# Patient Record
Sex: MALE | Race: WHITE | NOT HISPANIC OR LATINO | Employment: OTHER | URBAN - METROPOLITAN AREA
[De-identification: names, ages, dates, MRNs, and addresses within clinical notes are randomized per-mention and may not be internally consistent; named-entity substitution may affect disease eponyms.]

---

## 2022-09-01 ENCOUNTER — OFFICE VISIT (OUTPATIENT)
Dept: GASTROENTEROLOGY | Facility: CLINIC | Age: 32
End: 2022-09-01

## 2022-09-01 VITALS
DIASTOLIC BLOOD PRESSURE: 82 MMHG | HEIGHT: 72 IN | BODY MASS INDEX: 28.33 KG/M2 | WEIGHT: 209.2 LBS | SYSTOLIC BLOOD PRESSURE: 140 MMHG

## 2022-09-01 DIAGNOSIS — K21.9 GASTROESOPHAGEAL REFLUX DISEASE WITHOUT ESOPHAGITIS: ICD-10-CM

## 2022-09-01 DIAGNOSIS — R10.11 RUQ ABDOMINAL PAIN: Primary | ICD-10-CM

## 2023-05-05 ENCOUNTER — TELEPHONE (OUTPATIENT)
Dept: GASTROENTEROLOGY | Facility: CLINIC | Age: 33
End: 2023-05-05

## 2023-05-05 NOTE — TELEPHONE ENCOUNTER
Patients GI provider:  Dr Lu Campbell    Number to return call: [phone]    Reason for call: Pt calling stating had gall bladder attack, urine is dark and feels tired       Scheduled procedure/appointment date if applicable: Apt 8/7/83

## 2023-05-05 NOTE — TELEPHONE ENCOUNTER
I spoke with patient's wife and reviewed recommendations  I provided [facility] scheduling phone number  They will complete us and labs previously ordered

## 2023-05-05 NOTE — TELEPHONE ENCOUNTER
"I spoke with patient, he has had 3 \"attacks\" past week, first time since September visit  Usually comes on approximately 9:15 pm then dissipates  One episode did wake him up from sleep  This is the first time he noticed darker urine  He states he is not showing any signs of jaundice  I advised patient complete the testing you ordered in September to evaluate for stones  Did you want to order any other testing  I am to contact his wife to update and provide [facility] scheduling phone number  Please advise    "

## 2023-05-06 LAB
ALBUMIN SERPL-MCNC: 4.7 G/DL (ref 3.6–5.1)
ALBUMIN/GLOB SERPL: 1.7 (CALC) (ref 1–2.5)
ALP SERPL-CCNC: 129 U/L (ref 36–130)
ALT SERPL-CCNC: 1016 U/L (ref 9–46)
AST SERPL-CCNC: 417 U/L (ref 10–40)
BASOPHILS # BLD AUTO: 18 CELLS/UL (ref 0–200)
BASOPHILS NFR BLD AUTO: 0.3 %
BILIRUB SERPL-MCNC: 1.8 MG/DL (ref 0.2–1.2)
BUN SERPL-MCNC: 13 MG/DL (ref 7–25)
BUN/CREAT SERPL: ABNORMAL (CALC) (ref 6–22)
CALCIUM SERPL-MCNC: 9.5 MG/DL (ref 8.6–10.3)
CHLORIDE SERPL-SCNC: 104 MMOL/L (ref 98–110)
CO2 SERPL-SCNC: 28 MMOL/L (ref 20–32)
CREAT SERPL-MCNC: 0.91 MG/DL (ref 0.6–1.26)
EOSINOPHIL # BLD AUTO: 159 CELLS/UL (ref 15–500)
EOSINOPHIL NFR BLD AUTO: 2.6 %
ERYTHROCYTE [DISTWIDTH] IN BLOOD BY AUTOMATED COUNT: 12.5 % (ref 11–15)
GFR/BSA.PRED SERPLBLD CYS-BASED-ARV: 116 ML/MIN/1.73M2
GLOBULIN SER CALC-MCNC: 2.8 G/DL (CALC) (ref 1.9–3.7)
GLUCOSE SERPL-MCNC: 102 MG/DL (ref 65–99)
HCT VFR BLD AUTO: 42 % (ref 38.5–50)
HGB BLD-MCNC: 14.2 G/DL (ref 13.2–17.1)
LYMPHOCYTES # BLD AUTO: 1025 CELLS/UL (ref 850–3900)
LYMPHOCYTES NFR BLD AUTO: 16.8 %
MCH RBC QN AUTO: 29.2 PG (ref 27–33)
MCHC RBC AUTO-ENTMCNC: 33.8 G/DL (ref 32–36)
MCV RBC AUTO: 86.2 FL (ref 80–100)
MONOCYTES # BLD AUTO: 232 CELLS/UL (ref 200–950)
MONOCYTES NFR BLD AUTO: 3.8 %
NEUTROPHILS # BLD AUTO: 4667 CELLS/UL (ref 1500–7800)
NEUTROPHILS NFR BLD AUTO: 76.5 %
PLATELET # BLD AUTO: 210 THOUSAND/UL (ref 140–400)
PMV BLD REES-ECKER: 12.1 FL (ref 7.5–12.5)
POTASSIUM SERPL-SCNC: 3.5 MMOL/L (ref 3.5–5.3)
PROT SERPL-MCNC: 7.5 G/DL (ref 6.1–8.1)
RBC # BLD AUTO: 4.87 MILLION/UL (ref 4.2–5.8)
SODIUM SERPL-SCNC: 139 MMOL/L (ref 135–146)
WBC # BLD AUTO: 6.1 THOUSAND/UL (ref 3.8–10.8)

## 2023-05-08 ENCOUNTER — TELEPHONE (OUTPATIENT)
Dept: GASTROENTEROLOGY | Facility: CLINIC | Age: 33
End: 2023-05-08

## 2023-05-08 DIAGNOSIS — K80.50 BILIARY COLIC: Primary | ICD-10-CM

## 2023-05-08 NOTE — TELEPHONE ENCOUNTER
Could you please call pt's wife back with lab results as they are abnormal    5/5 telephone encounter has more information  He is scheduled for his US on 5/12

## 2023-05-08 NOTE — TELEPHONE ENCOUNTER
Patients GI provider:  Dr May Vail    Number to return call: [phone]    Reason for call: Pt wife calling for blood work results       Scheduled procedure/appointment date if applicable:

## 2023-05-08 NOTE — TELEPHONE ENCOUNTER
Pt's wife calling again in regards to pt's results  Please reach out to pt's wife, Fany Villarreal, in regards to this matter  She can be reached at [phone]

## 2023-05-10 DIAGNOSIS — K80.50 BILIARY COLIC: Primary | ICD-10-CM

## 2023-05-10 DIAGNOSIS — R79.89 ELEVATED LFTS: ICD-10-CM

## 2023-05-10 LAB
ALBUMIN SERPL-MCNC: 4.7 G/DL (ref 3.6–5.1)
ALBUMIN/GLOB SERPL: 1.7 (CALC) (ref 1–2.5)
ALP SERPL-CCNC: 119 U/L (ref 36–130)
ALT SERPL-CCNC: 488 U/L (ref 9–46)
AST SERPL-CCNC: 104 U/L (ref 10–40)
BILIRUB SERPL-MCNC: 0.9 MG/DL (ref 0.2–1.2)
BUN SERPL-MCNC: 16 MG/DL (ref 7–25)
BUN/CREAT SERPL: ABNORMAL (CALC) (ref 6–22)
CALCIUM SERPL-MCNC: 10.4 MG/DL (ref 8.6–10.3)
CHLORIDE SERPL-SCNC: 103 MMOL/L (ref 98–110)
CO2 SERPL-SCNC: 29 MMOL/L (ref 20–32)
CREAT SERPL-MCNC: 0.96 MG/DL (ref 0.6–1.26)
GFR/BSA.PRED SERPLBLD CYS-BASED-ARV: 108 ML/MIN/1.73M2
GLOBULIN SER CALC-MCNC: 2.8 G/DL (CALC) (ref 1.9–3.7)
GLUCOSE SERPL-MCNC: 82 MG/DL (ref 65–99)
POTASSIUM SERPL-SCNC: 4.4 MMOL/L (ref 3.5–5.3)
PROT SERPL-MCNC: 7.5 G/DL (ref 6.1–8.1)
SODIUM SERPL-SCNC: 141 MMOL/L (ref 135–146)

## 2023-05-12 ENCOUNTER — HOSPITAL ENCOUNTER (OUTPATIENT)
Dept: ULTRASOUND IMAGING | Facility: HOSPITAL | Age: 33
Discharge: HOME | End: 2023-05-12
Attending: INTERNAL MEDICINE

## 2023-05-12 DIAGNOSIS — R10.11 RUQ ABDOMINAL PAIN: ICD-10-CM

## 2023-05-16 ENCOUNTER — TELEPHONE (OUTPATIENT)
Dept: GASTROENTEROLOGY | Facility: CLINIC | Age: 33
End: 2023-05-16

## 2023-05-16 NOTE — TELEPHONE ENCOUNTER
Returned Ramon's wife, Per Score, phone call regarding result's of [address]  Not read yet, please call when resulted

## 2023-05-18 DIAGNOSIS — K80.50 BILIARY COLIC: Primary | ICD-10-CM

## 2023-06-19 ENCOUNTER — TELEPHONE (OUTPATIENT)
Age: 33
End: 2023-06-19

## 2023-06-19 NOTE — TELEPHONE ENCOUNTER
Patients GI provider:  Dr Brennan Estrada    Number to return call: ( [phone])    Reason for call: Pt calling reports he had surgery wile on vacation to have gallbladder removed  Pt has appt 7/7/2023  Wife is asking if this is to long to wait or do you want him squeezed in anywhere?      Scheduled procedure/appointment date if applicable: Apt/procedure

## 2023-06-26 ENCOUNTER — TELEPHONE (OUTPATIENT)
Age: 33
End: 2023-06-26

## 2023-06-26 NOTE — TELEPHONE ENCOUNTER
Patients GI provider:  Dr Carrion [facility]    Number to return call: [phone]    Reason for call: Pt's wife Cinthya Landry calling because she states the pt had emergency gallbladder surgery while they were on vacation and she would like to know if he should have a sooner appointment      Scheduled procedure/appointment date if applicable: Appt 0/6/13

## 2023-07-07 ENCOUNTER — OFFICE VISIT (OUTPATIENT)
Age: 33
End: 2023-07-07

## 2023-07-07 VITALS
WEIGHT: 270.8 LBS | DIASTOLIC BLOOD PRESSURE: 70 MMHG | SYSTOLIC BLOOD PRESSURE: 118 MMHG | BODY MASS INDEX: 36.68 KG/M2 | HEIGHT: 72 IN

## 2023-07-07 DIAGNOSIS — R79.89 ELEVATED LFTS: Primary | ICD-10-CM

## 2023-07-07 DIAGNOSIS — K76.0 FATTY LIVER: ICD-10-CM

## 2023-07-07 PROCEDURE — 99213 OFFICE O/P EST LOW 20 MIN: CPT | Performed by: INTERNAL MEDICINE

## 2023-07-07 NOTE — PROGRESS NOTES
[facility] - Outpatient Follow-up Note  Amna Solders 33 y.o. male MRN: [id number]  Encounter: [id number]    ASSESSMENT AND PLAN:      1. Elevated LFTs  Patient's last LFTs were still elevated although trending down from his hospitalization with gallstone pancreatitis. We will recheck LFTs to resolution.  - Hepatic function panel; Future  - Hepatic function panel    2. Fatty liver  Patient was noted to have fatty liver disease on US. Obtain LFTs to see if theres any component of Levy. Encouraged diet and exercise     Follow up appointment: Recheck LFTs  ______________________________________________________________________    Chief Complaint   Patient presents with   • follow up cholecystectomy     HPI:   62-year-old male who is presenting for follow-up regarding abdominal pain. Was last seen in the office on 9/22 with right upper quadrant abdominal pain and GERD. LFTs in June 2023 notable to be elevated AST of 59 ALT of 232 alk phos of 144 T. bili of 0.9. Right upper quadrant ultrasound with dilated intrahepatic biliary duct with gallstones possibility of choledocholithiasis. Gallstones. Diffuse fatty liver. MRCP showed acute pancreatitis with no evidence of choledocholithiasis. Status post cholecystectomy. Now feeling well after his procedure. No longer having any right upper quadrant abdominal pain. No nausea or vomiting. No change of bowel habits with diarrhea. Heartburn is largely resolved as well    Historical Information   History reviewed. No pertinent past medical history.   Past Surgical History:   Procedure Laterality Date   • CHOLECYSTECTOMY       Social History     Substance and Sexual Activity   Alcohol Use Yes    Comment: occasionally     Social History     Substance and Sexual Activity   Drug Use Never     Social History     Tobacco Use   Smoking Status Former   Smokeless Tobacco Never     Family History   Problem Relation Age of Onset   • No Known Problems Mother    • No Known Problems Father    • No Known Problems Brother    • No Known Problems Brother    • No Known Problems Brother    • Colon cancer Neg Hx    • Colon polyps Neg Hx        No current outpatient medications on file. Allergies   Allergen Reactions   • Amoxicillin Rash   • Cefazolin Rash   • Erythromycin Rash     Reviewed medications and allergies and updated as indicated    PHYSICAL EXAM:    Blood pressure 118/70, height 6' (1.829 m), weight 94.3 kg (207 lb 12.8 oz). Body mass index is 28.18 kg/m². General Appearance: NAD, cooperative, alert  Eyes: Anicteric  GI:  Soft, non-tender, non-distended; normal bowel sounds; no masses, no organomegaly . Surgical incision clean dry intact  Rectal: Deferred  Musculoskeletal: No edema. Skin:  No jaundice    Lab Results:   Lab Results   Component Value Date    WBC 6.1 05/05/2023    HGB 14.2 05/05/2023    MCV 86.2 05/05/2023     05/05/2023     Lab Results   Component Value Date    K 4.4 05/09/2023     05/09/2023    CO2 29 05/09/2023    BUN 16 05/09/2023    CREATININE 0.96 05/09/2023    CALCIUM 10.4 (H) 05/09/2023     (H) 05/09/2023     (H) 05/05/2023     (H) 05/09/2023    ALT 1,016 (H) 05/05/2023    ALKPHOS 119 05/09/2023    ALKPHOS 129 05/05/2023    EGFR 108 05/09/2023     No results found for: "IRON", "TIBC", "FERRITIN"  No results found for: "LIPASE"    Radiology Results:   XR Chest PA And Lateral    Result Date: 6/17/2023  Narrative: EXAM: XR CHEST PA AND LATERAL INDICATION: Fever TECHNIQUE: XR CHEST PA AND LATERAL COMPARISON: None. FINDINGS: Lungs: There is some streaky airspace opacification in the posterior aspect of the lungs best noted on the lateral view. Additional more linear opacities are also noted anteriorly in the lower lungs. Findings suggest a combination of pneumonia and atelectasis. Cardiac silhouette: Unremarkable. Mediastinum: Unremarkable. Pleura: Unremarkable. Bones:  Unremarkable.  Upper abdomen: Unremarkable.    IMPRESSION: Bibasilar airspace opacities which may represent pneumonia, aspiration, atelectasis, or most likely a combination of these. * * THIS IS AN ELECTRONICALLY VERIFIED REPORT CREATED USING VOICE RECOGNITION  * * 6/17/2023 9:04 ARELYT Estella Almeida MD, [facility] [address] [place], [place] [id number]    MR Abdomen MRCP WO Contrast    Result Date: 6/13/2023  Narrative: EXAM:  MR ABDOMEN MRCP WO CONTRAST CLINICAL HISTORY FROM PROVIDER: [facility] for choledocholithiasis,   COMPARISON:  6/13/2023 right upper quadrant ultrasound. TECHNIQUE:  Multiplanar and multisequence TI and T2 weighted imaging was performed with and without contrast, along with 3D MIP imaging at the time of acquisition. FINDINGS: Liver:  Unremarkable Gallbladder: Multiple gallstones. No gallbladder wall thickening or pericholecystic edema. MRCP: Estefany Hopping is normal bile duct anatomy. Mild intrahepatic and extrahepatic biliary dilatation. The common bile duct measures 6 to 7 mm.  No definite stones are identified. Pancreas:  Incidental note is made of severe parapancreatic edema consistent with pancreatitis. No pancreatic duct dilatation. No evidence of splenic or mesenteric vein thrombus. No evidence of pseudocyst. Other Findings:  The visualized portions of the kidneys, spleen and adrenal glands are unremarkable. Mild free peritoneal fluid.  Visualized bowel loops are normal in caliber.  There is no lymphadenopathy by size criteria.  Visualized bone marrow signal is normal. Trace bilateral pleural effusions. IMPRESSION:   1.  Acute pancreatitis. 2.  Cholelithiasis. No evidence of cholecystitis or choledocholithiasis. * * THIS IS AN ELECTRONICALLY VERIFIED REPORT CREATED USING VOICE RECOGNITION  * * 6/13/2023 18:25 EDT Miracle Vasquez MD, [facility] [place]     [id number]    US Abdominal RUQ    Result Date: 6/13/2023  Narrative: EXAM: US ABDOMINAL RUQ INDICATION: Epigastric pain. Known gallstones. TECHNIQUE: Real-time gray-scale with spectral and color doppler ultrasound was utilized to perform a sonogram of the abdominal right upper quadrant. COMPARISON: None. FINDINGS: Pancreas: The pancreas is obscured by bowel gas. Liver: The liver is echogenic compatible diffuse fatty infiltration. There is focal fatty sparing adjacent to the gallbladder fossa. RIGHT lobe length: 17.2 (cm) Portal Vein: Hepatopedal flow. Intrahepatic ducts: Unremarkable. Common bile duct: Dilated for a patient of this age. CBD diameter: 7.3 (mm) Gallbladder: There are echogenic shadowing gallstones measuring up to 2.2 cm. No gallbladder wall thickening. No hyperemia of the gallbladder wall. Wall: 3 (mm) RIGHT kidney: Unremarkable. Length: 9.4 (cm) IMPRESSION: 1. Dilated extrahepatic bile duct in a patient with gallstones. This raises the possibility of choledocholithiasis. Clinical and laboratory correlation is recommended for any evidence of biliary obstruction. Follow-up MRCP can be obtained for further characterization, if clinically warranted. 2. Gallstones. No sonographic evidence of acute cholecystitis. 3. Diffuse fatty infiltration of the liver.    * * THIS IS AN ELECTRONICALLY VERIFIED REPORT CREATED USING VOICE RECOGNITION  * * 6/13/2023 16:07 EDT Luis Manuel Hernandez MD, [facility] [address] [place] [address] ([address] [place] [address])    Result Date: 6/13/2023  Narrative: This result has an attachment that is not available.